# Patient Record
Sex: FEMALE | Race: WHITE | URBAN - METROPOLITAN AREA
[De-identification: names, ages, dates, MRNs, and addresses within clinical notes are randomized per-mention and may not be internally consistent; named-entity substitution may affect disease eponyms.]

---

## 2018-04-24 ENCOUNTER — TELEPHONE (OUTPATIENT)
Dept: OTHER | Facility: CLINIC | Age: 38
End: 2018-04-24

## 2018-04-24 NOTE — TELEPHONE ENCOUNTER
4/24/2018    Call Regarding Onboarding [employer] choices     Attempt 1    Message spoke with patient    Comments: Patient would like a callback at a later date      Outreach   Aure Hwang

## 2018-10-24 ENCOUNTER — RADIANT APPOINTMENT (OUTPATIENT)
Dept: GENERAL RADIOLOGY | Facility: CLINIC | Age: 38
End: 2018-10-24
Attending: FAMILY MEDICINE
Payer: COMMERCIAL

## 2018-10-24 ENCOUNTER — OFFICE VISIT (OUTPATIENT)
Dept: ORTHOPEDICS | Facility: CLINIC | Age: 38
End: 2018-10-24
Payer: COMMERCIAL

## 2018-10-24 VITALS
BODY MASS INDEX: 27.6 KG/M2 | HEIGHT: 62 IN | DIASTOLIC BLOOD PRESSURE: 81 MMHG | SYSTOLIC BLOOD PRESSURE: 119 MMHG | WEIGHT: 150 LBS

## 2018-10-24 DIAGNOSIS — S83.001A PATELLAR SUBLUXATION, RIGHT, INITIAL ENCOUNTER: Primary | ICD-10-CM

## 2018-10-24 DIAGNOSIS — M25.561 ACUTE PAIN OF RIGHT KNEE: ICD-10-CM

## 2018-10-24 PROCEDURE — 73562 X-RAY EXAM OF KNEE 3: CPT | Performed by: FAMILY MEDICINE

## 2018-10-24 PROCEDURE — 99203 OFFICE O/P NEW LOW 30 MIN: CPT | Performed by: FAMILY MEDICINE

## 2018-10-24 NOTE — PROGRESS NOTES
"[facility]   Clinic Visit s Oct 24, 2018    PCP: No Ref-Primary, Physician      Milla is a 37 year old female who is seen as self referral for   Chief Complaint   Patient presents with     Right Knee - Pain       Injury: {Precipitating Event:183709::\"Patient reports insidious onset without acute precipitating event.\"}     {HAND DOMINANCE:986997}    Location of Pain: {AJ side:593245} {AJ Location Joints:900149} {AJ location on limb:088481}, {AJ radiating pain:211334}   Duration of Pain: *** {DAY/WEEK/MONTH/YEAR:734979}  Rating of Pain at worst: {PAIN SCALE:788727}  Rating of Pain Currently: {PAIN SCALE:286955}  Pain is better with: {Pain (activities that relieve):642563}   Pain is worse with: {Worsenin}   Treatment so far consists of: {AJ treatment options:110528}  Associated symptoms: {AJassociatedSX:685545}  Recent imaging completed: {Imagin}.  Prior History of related problems: ***    Social History: {Work history:118595}     Past Medical History:   Diagnosis Date     Asthma        There are no active problems to display for this patient.      No family history on file.    Social History     Social History     Marital status: Single     Spouse name: N/A     Number of children: N/A     Years of education: N/A     Social History Main Topics     Smoking status: Current Every Day Smoker     Packs/day: 0.50     Smokeless tobacco: Never Used     Alcohol use 0.0 oz/week     1 - 3 Glasses of wine per week      Comment: daily     Drug use: No     Sexual activity: Yes     Partners: Female, Male     Birth control/ protection: Condom     Other Topics Concern     Not on file     Social History Narrative       Past Surgical History:   Procedure Laterality Date     EXTRACTION(S) DENTAL         ROS      Physical Exam    Ortho Exam  No results found for this or any previous visit (from the past 744 hour(s)).    "

## 2018-10-24 NOTE — LETTER
10/24/2018         RE: Milla Gottlieb  [address] [address]        Dear Colleague,    Thank you for referring your patient, Milla Gottlieb, to the  SPORTS MEDICINE. Please see a copy of my visit note below.    [facility]   Clinic Visit s Oct 24, 2018    PCP: No Ref-Primary, Physician      Milla is a 37 year old female who is seen as self referral for   Chief Complaint   Patient presents with     Right Knee - Pain       Injury: Patient describes injury as she pivoted and heard a pop in her right knee.     Right hand dominant    Location of Pain: right knee medial, nonradiating   Duration of Pain: 6 day(s)  Rating of Pain at worst: 8/10  Rating of Pain Currently: 2/10  Pain is better with: brace, activity avoidance, compression, rest and ibuprofen   Pain is worse with: mornings, pivoting, pushing, stair climbing and walking    Treatment so far consists of: activity avoidance, brace, compression, rest and ibuprofen  Associated symptoms: feeling of instability, locking or catching and swelling Mild  Recent imaging completed: No recent imaging completed.  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     Past Medical History:   Diagnosis Date     Asthma        FMHX denies sig illnesses.      Social History     Social History     Marital status: Single     Spouse name: N/A     Number of children: N/A     Years of education: N/A     Social History Main Topics     Smoking status: Current Every Day Smoker     Packs/day: 0.50     Smokeless tobacco: Never Used     Alcohol use 0.0 oz/week     1 - 3 Glasses of wine per week      Comment: daily       Past Surgical History:   Procedure Laterality Date     EXTRACTION(S) DENTAL               Review of Systems   Musculoskeletal: Positive for joint pain.   All other systems reviewed and are negative.        Physical  "Exam  /81  Ht 5' 2\" (1.575 m)  Wt 150 lb (68 kg)  BMI 27.44 kg/m2  Constitutional:well-developed, well-nourished, and in no distress.   Cardiovascular: Intact distal pulses.    Neurological: alert. Gait Normal:   Gait, station, stance, and balance appear normal for age  Skin: Skin is warm and dry.   Psychiatric: Mood and affect normal.   Respiratory: unlabored, speaks in full sentences  Lymph: no LAD, no lymphangitis          Right Knee Exam   Swelling: None  Effusion: No    Tenderness   The patient is experiencing tenderness in the medial retinaculum.    Range of Motion   Extension: Normal  Flexion:     Normal    Tests   McMurrays:  Medial - Negative      Lateral - Negative  Lachman:  Anterior - Negative    Posterior - Negative  Drawer:       Anterior - Negative    Posterior - Negative  Varus:  Negative  Valgus: Negative  Pivot Shift: Negative  Patellar Apprehension: Yes          X-ray images Ordered and independently reviewed by me in the office today with the patient. X-ray shows:   Recent Results (from the past 48 hour(s))   XR Knee Standing AP Bilat [place] Bilat Lat Right    Narrative    10/24/2018    No fracture, dislocation and or lesion. Normal alignment.  Joint space   maintained no significant arthritis. No appreciable soft tissue   abnormality       ASSESSMENT/PLAN    ICD-10-CM    1. Patellar subluxation, right, initial encounter S83.001A order for DME   2. Acute pain of right knee M25.561 XR Knee Standing AP Bilat [place] Bilat Lat Right     order for DME     Patella subluxation, relatively mild, normal gait, no evidence of significant internal derangement, reassurance, given hinged knee brace with patellar buttress, urged to wear for at least 2 weeks and longer if needed, discussed physical therapy but will defer for now unless repeated episodes develop.    Again, thank you for allowing me to participate in the care of your patient.        Sincerely,        Brian Wilkins MD    "

## 2018-10-24 NOTE — MR AVS SNAPSHOT
"              After Visit Summary   10/24/2018    Milla Gottlieb    MRN: [id number]           Patient Information     Date Of Birth          1980        Visit Information        Provider Department      10/24/2018 9:40 AM Brian Wilkins MD  Sports Medicine        Today's Diagnoses     Patellar subluxation, right, initial encounter    -  1    Acute pain of right knee           Follow-ups after your visit        Who to contact     If you have questions or need follow up information about today's clinic visit or your schedule please contact  SPORTS MEDICINE directly at [phone].  Normal or non-critical lab and imaging results will be communicated to you by Waspithart, letter or phone within 4 business days after the clinic has received the results. If you do not hear from us within 7 days, please contact the clinic through SimpleRegistryt or phone. If you have a critical or abnormal lab result, we will notify you by phone as soon as possible.  Submit refill requests through [employer] or call your pharmacy and they will forward the refill request to us. Please allow 3 business days for your refill to be completed.          Additional Information About Your Visit        MyChart Information     [employer] lets you send messages to your doctor, view your test results, renew your prescriptions, schedule appointments and more. To sign up, go to [email or web address] . Click on \"Log in\" on the left side of the screen, which will take you to the Welcome page. Then click on \"Sign up Now\" on the right side of the page.     You will be asked to enter the access code listed below, as well as some personal information. Please follow the directions to create your username and password.     Your access code is: [email or web address]  Expires: 2019  8:29 PM     Your access code will  in 90 days. If you need help or a new code, please call your [facility] clinic or [phone].        Care EveryWhere ID     This is your Care " "EveryWhere ID. This could be used by other organizations to access your [place] medical records  [id number]        Your Vitals Were     Height BMI (Body Mass Index)                5' 2\" (1.575 m) 27.44 kg/m2           Blood Pressure from Last 3 Encounters:   10/24/18 119/81   07/19/11 124/77    Weight from Last 3 Encounters:   10/24/18 150 lb (68 kg)   07/19/11 130 lb (59 kg)                 Today's Medication Changes          These changes are accurate as of 10/24/18  8:29 PM.  If you have any questions, ask your nurse or doctor.               Start taking these medicines.        Dose/Directions    order for DME   Used for:  Patellar subluxation, right, initial encounter, Acute pain of right knee   Started by:  Brian Wilkins MD        Equipment being ordered: hinged knee brace, LG   Quantity:  1 Device   Refills:  0            Where to get your medicines      Some of these will need a paper prescription and others can be bought over the counter.  Ask your nurse if you have questions.     Bring a paper prescription for each of these medications     order for DME                Primary Care Provider Fax #    Physician No Ref-Primary [phone]       No address on file        Equal Access to Services     [facility] : Hadzoe Alfred, wahomero costello, qaal gutierrez, lupillo calix . So [facility] [phone].    ATENCIÓN: Si habla español, tiene a hawkins disposición servicios gratuitos de asistencia lingüística. Llame al [phone].    We comply with applicable federal civil rights laws and [place] laws. We do not discriminate on the basis of race, color, national origin, age, disability, sex, sexual orientation, or gender identity.            Thank you!     Thank you for choosing  SPORTS MEDICINE  for your care. Our goal is always to provide you with excellent care. Hearing back from our patients is one way we can continue to improve our services. " Please take a few minutes to complete the written survey that you may receive in the mail after your visit with us. Thank you!             Your Updated Medication List - Protect others around you: Learn how to safely use, store and throw away your medicines at www.disposemymeds.org.          This list is accurate as of 10/24/18  8:29 PM.  Always use your most recent med list.                   Brand Name Dispense Instructions for use Diagnosis    ADVIL 200 MG capsule   Generic drug:  ibuprofen      Take 400 mg by mouth every 4 hours as needed.        albuterol 90 MCG/ACT inhaler      Inhale 2 puffs into the lungs every 6 hours as needed.        order for DME     1 Device    Equipment being ordered: hinged knee brace, LG    Patellar subluxation, right, initial encounter, Acute pain of right knee       SINGULAIR PO

## 2018-10-24 NOTE — PROGRESS NOTES
"[facility]   Clinic Visit s Oct 24, 2018    PCP: No Ref-Primary, Physician      Milla is a 37 year old female who is seen as self referral for   Chief Complaint   Patient presents with     Right Knee - Pain       Injury: Patient describes injury as she pivoted and heard a pop in her right knee.     Right hand dominant    Location of Pain: right knee medial, nonradiating   Duration of Pain: 6 day(s)  Rating of Pain at worst: 8/10  Rating of Pain Currently: 2/10  Pain is better with: brace, activity avoidance, compression, rest and ibuprofen   Pain is worse with: mornings, pivoting, pushing, stair climbing and walking    Treatment so far consists of: activity avoidance, brace, compression, rest and ibuprofen  Associated symptoms: feeling of instability, locking or catching and swelling Mild  Recent imaging completed: No recent imaging completed.  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     Past Medical History:   Diagnosis Date     Asthma        FMHX denies sig illnesses.      Social History     Social History     Marital status: Single     Spouse name: N/A     Number of children: N/A     Years of education: N/A     Social History Main Topics     Smoking status: Current Every Day Smoker     Packs/day: 0.50     Smokeless tobacco: Never Used     Alcohol use 0.0 oz/week     1 - 3 Glasses of wine per week      Comment: daily       Past Surgical History:   Procedure Laterality Date     EXTRACTION(S) DENTAL               Review of Systems   Musculoskeletal: Positive for joint pain.   All other systems reviewed and are negative.        Physical Exam  /81  Ht 5' 2\" (1.575 m)  Wt 150 lb (68 kg)  BMI 27.44 kg/m2  Constitutional:well-developed, well-nourished, and in no distress.   Cardiovascular: Intact distal pulses.    Neurological: alert. Gait Normal:   Gait, station, " stance, and balance appear normal for age  Skin: Skin is warm and dry.   Psychiatric: Mood and affect normal.   Respiratory: unlabored, speaks in full sentences  Lymph: no LAD, no lymphangitis          Right Knee Exam   Swelling: None  Effusion: No    Tenderness   The patient is experiencing tenderness in the medial retinaculum.    Range of Motion   Extension: Normal  Flexion:     Normal    Tests   McMurrays:  Medial - Negative      Lateral - Negative  Lachman:  Anterior - Negative    Posterior - Negative  Drawer:       Anterior - Negative    Posterior - Negative  Varus:  Negative  Valgus: Negative  Pivot Shift: Negative  Patellar Apprehension: Yes          X-ray images Ordered and independently reviewed by me in the office today with the patient. X-ray shows:   Recent Results (from the past 48 hour(s))   XR Knee Standing AP Bilat [place] Bilat Lat Right    Narrative    10/24/2018    No fracture, dislocation and or lesion. Normal alignment.  Joint space   maintained no significant arthritis. No appreciable soft tissue   abnormality       ASSESSMENT/PLAN    ICD-10-CM    1. Patellar subluxation, right, initial encounter S83.001A order for DME   2. Acute pain of right knee M25.561 XR Knee Standing AP Bilat [place] Bilat Lat Right     order for DME     Patella subluxation, relatively mild, normal gait, no evidence of significant internal derangement, reassurance, given hinged knee brace with patellar buttress, urged to wear for at least 2 weeks and longer if needed, discussed physical therapy but will defer for now unless repeated episodes develop.

## 2018-11-30 ENCOUNTER — OFFICE VISIT (OUTPATIENT)
Dept: ORTHOPEDICS | Facility: CLINIC | Age: 38
End: 2018-11-30
Payer: COMMERCIAL

## 2018-11-30 VITALS
WEIGHT: 150 LBS | HEIGHT: 62 IN | BODY MASS INDEX: 27.6 KG/M2 | DIASTOLIC BLOOD PRESSURE: 62 MMHG | SYSTOLIC BLOOD PRESSURE: 112 MMHG

## 2018-11-30 DIAGNOSIS — M25.561 ACUTE PAIN OF RIGHT KNEE: Primary | ICD-10-CM

## 2018-11-30 PROCEDURE — 99214 OFFICE O/P EST MOD 30 MIN: CPT | Performed by: FAMILY MEDICINE

## 2018-11-30 RX ORDER — HYDROCODONE BITARTRATE AND ACETAMINOPHEN 5; 325 MG/1; MG/1
1 TABLET ORAL EVERY 6 HOURS PRN
Qty: 10 TABLET | Refills: 0 | Status: SHIPPED | OUTPATIENT
Start: 2018-11-30 | End: 2018-12-20

## 2018-11-30 NOTE — MR AVS SNAPSHOT
"              After Visit Summary   11/30/2018    Milla Gottlieb    MRN: [id number]           Patient Information     Date Of Birth          1980        Visit Information        Provider Department      11/30/2018 1:20 PM Brian Wilkins MD [facility] [place]        Today's Diagnoses     Acute pain of right knee    -  1       Follow-ups after your visit        Future tests that were ordered for you today     Open Future Orders        Priority Expected Expires Ordered    MR Knee Right w/o Contrast STAT  11/30/2019 11/30/2018            Who to contact     If you have questions or need follow up information about today's clinic visit or your schedule please contact [facility] [place] directly at [phone].  Normal or non-critical lab and imaging results will be communicated to you by Gimmiehart, letter or phone within 4 business days after the clinic has received the results. If you do not hear from us within 7 days, please contact the clinic through Gimmiehart or phone. If you have a critical or abnormal lab result, we will notify you by phone as soon as possible.  Submit refill requests through [facility] or call your pharmacy and they will forward the refill request to us. Please allow 3 business days for your refill to be completed.          Additional Information About Your Visit        MyCharEdeniQ Information     [facility] lets you send messages to your doctor, view your test results, renew your prescriptions, schedule appointments and more. To sign up, go to [email or web address] . Click on \"Log in\" on the left side of the screen, which will take you to the Welcome page. Then click on \"Sign up Now\" on the right side of the page.     You will be asked to enter the access code listed below, as well as some personal information. Please follow the directions to create your username and password.     Your access code is: [email or web address]  Expires: 1/22/2019 " " 7:29 PM     Your access code will  in 90 days. If you need help or a new code, please call your [facility] or [phone].        Care EveryWhere ID     This is your Care EveryWhere ID. This could be used by other organizations to access your [place] medical records  [id number]        Your Vitals Were     Height BMI (Body Mass Index)                5' 2\" (1.575 m) 27.44 kg/m2           Blood Pressure from Last 3 Encounters:   18 112/62   10/24/18 119/81   11 124/77    Weight from Last 3 Encounters:   18 150 lb (68 kg)   10/24/18 150 lb (68 kg)   11 130 lb (59 kg)                 Today's Medication Changes          These changes are accurate as of 18  1:48 PM.  If you have any questions, ask your nurse or doctor.               Start taking these medicines.        Dose/Directions    HYDROcodone-acetaminophen 5-325 MG tablet   Commonly known as:  NORCO   Used for:  Acute pain of right knee   Started by:  Brian Wilkins MD        Dose:  1 tablet   Take 1 tablet by mouth every 6 hours as needed for severe pain   Quantity:  10 tablet   Refills:  0            Where to get your medicines      Some of these will need a paper prescription and others can be bought over the counter.  Ask your nurse if you have questions.     Bring a paper prescription for each of these medications     HYDROcodone-acetaminophen 5-325 MG tablet               Information about OPIOIDS     PRESCRIPTION OPIOIDS: WHAT YOU NEED TO KNOW   We gave you an opioid (narcotic) pain medicine. It is important to manage your pain, but opioids are not always the best choice. You should first try all the other options your care team gave you. Take this medicine for as short a time (and as few doses) as possible.    Some activities can increase your pain, such as bandage changes or therapy sessions. It may help to take your pain medicine 30 to 60 minutes before these activities. Reduce your stress by getting " enough sleep, working on hobbies you enjoy and practicing relaxation or meditation. Talk to your care team about ways to manage your pain beyond prescription opioids.    These medicines have risks:    DO NOT drive when on new or higher doses of pain medicine. These medicines can affect your alertness and reaction times, and you could be arrested for driving under the influence (DUI). If you need to use opioids long-term, talk to your care team about driving.    DO NOT operate heavy machinery    DO NOT do any other dangerous activities while taking these medicines.    DO NOT drink any alcohol while taking these medicines.     If the opioid prescribed includes acetaminophen, DO NOT take with any other medicines that contain acetaminophen. Read all labels carefully. Look for the word  acetaminophen  or  Tylenol.  Ask your pharmacist if you have questions or are unsure.    You can get addicted to pain medicines, especially if you have a history of addiction (chemical, alcohol or substance dependence). Talk to your care team about ways to reduce this risk.    All opioids tend to cause constipation. Drink plenty of water and eat foods that have a lot of fiber, such as fruits, vegetables, prune juice, apple juice and high-fiber cereal. Take a laxative (Miralax, milk of magnesia, Colace, Senna) if you don t move your bowels at least every other day. Other side effects include upset stomach, sleepiness, dizziness, throwing up, tolerance (needing more of the medicine to have the same effect), physical dependence and slowed breathing.    Store your pills in a secure place, locked if possible. We will not replace any lost or stolen medicine. If you don t finish your medicine, please throw away (dispose) as directed by your pharmacist. The [place] Pollution Control Agency has more information about safe disposal: https://www.pca.state.[place]/living-green/managing-unwanted-medications         Primary Care Provider Fax #     Provider Not In System [phone]                Equal Access to Services     NAVEEN HARRIS : Hadii aad ku hadbiarolo Alfred, yo costello, dimitri langmajoseph gutierrez, lupillo webb. So [facility] [phone].    ATENCIÓN: Si habla español, tiene a hawkins disposición servicios gratuitos de asistencia lingüística. Llame al [phone].    We comply with applicable federal civil rights laws and [place] laws. We do not discriminate on the basis of race, color, national origin, age, disability, sex, sexual orientation, or gender identity.            Thank you!     Thank you for choosing [place] [facility] [place]  for your care. Our goal is always to provide you with excellent care. Hearing back from our patients is one way we can continue to improve our services. Please take a few minutes to complete the written survey that you may receive in the mail after your visit with us. Thank you!             Your Updated Medication List - Protect others around you: Learn how to safely use, store and throw away your medicines at www.disposemymeds.org.          This list is accurate as of 11/30/18  1:48 PM.  Always use your most recent med list.                   Brand Name Dispense Instructions for use Diagnosis    ADVIL 200 MG capsule   Generic drug:  ibuprofen      Take 400 mg by mouth every 4 hours as needed.        albuterol 90 MCG/ACT inhaler      Inhale 2 puffs into the lungs every 6 hours as needed.        HYDROcodone-acetaminophen 5-325 MG tablet    NORCO    10 tablet    Take 1 tablet by mouth every 6 hours as needed for severe pain    Acute pain of right knee       order for DME     1 Device    Equipment being ordered: hinged knee brace, LG    Patellar subluxation, right, initial encounter, Acute pain of right knee       SINGULAIR PO

## 2018-11-30 NOTE — PROGRESS NOTES
"Musculoskeletal Problem          [facility]   Follow-up Visit s Nov 30, 2018    PCP: System, Provider Not In      Subjective:  Milla is a 37 year old female who is seen in follow up for evaluation of   Chief Complaint   Patient presents with     Right Knee - Pain     Her last visit was on 10/24/18.  Since that time, symptoms have been worse than before. Milla Gottlieb is accompanied today by self.     Patient has noticed improved symptoms with after initial injury but then yesterday she felt her knee pop and increased pain.  Patient has slight swelling  Pain is located right knee anterior and medial, persistent.  Patient is using crutches and knee immobilizer.   Patient denies any new injuries.    Patient's past medical, surgical, social and family histories are reviewed today.    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     Past Medical History:   Diagnosis Date     Asthma        FMHX denies sig illnesses.      Social History     Social History     Marital status: Single     Spouse name: N/A     Number of children: N/A     Years of education: N/A     Social History Main Topics     Smoking status: Current Every Day Smoker     Packs/day: 0.50     Smokeless tobacco: Never Used     Alcohol use 0.0 oz/week     1 - 3 Glasses of wine per week      Comment: daily       Past Surgical History:   Procedure Laterality Date     EXTRACTION(S) DENTAL               Review of Systems   Musculoskeletal: Positive for joint pain.   All other systems reviewed and are negative.        Physical Exam   Musculoskeletal:        Right knee: Medial joint line tenderness noted.     /62  Ht 5' 2\" (1.575 m)  Wt 150 lb (68 kg)  BMI 27.44 kg/m2  Constitutional:well-developed, well-nourished, and in no distress.   Cardiovascular: Intact distal pulses.    Neurological: alert. Gait Abnormal:   Antalgic gait  Skin: Skin is warm and dry. "   Psychiatric: Mood and affect normal.   Respiratory: unlabored, speaks in full sentences  Lymph: no LAD, no lymphangitis          Right Knee Exam   Swelling: Moderate  Effusion: Yes    Tenderness   The patient is experiencing tenderness in the medial joint line.    Range of Motion   Extension: 10  Flexion:     90    Tests   McMurrays:  Medial - Positive      Lateral - Negative  Lachman:  Anterior - Negative    Posterior - Negative  Drawer:       Anterior - Negative    Posterior - Negative  Varus:  Negative  Valgus: Negative  Pivot Shift: Negative  Patellar Apprehension: No      XR KNEE 3 VIEWS AP LAT SUNRISE RIGHT11/29/2018  [facility]  Result Narrative   INDICATION:  Knee injury.    TECHNIQUE:  Three views right knee.    FINDINGS:  No acute fracture or dislocation in right knee. Apparent tiny right knee effusion. Mild sclerosis along the tibial spines is very nonspecific. Right knee otherwise negative.      Dictated by Osman Molina MD @ Nov 29 2018  2:59PM    (Electronically Signed)   Other Result Information   Interface, [employer] - 11/29/2018  2:59 PM CST  INDICATION:  Knee injury.    TECHNIQUE:  Three views right knee.    FINDINGS:  No acute fracture or dislocation in right knee. Apparent tiny right knee effusion. Mild sclerosis along the tibial spines is very nonspecific. Right knee otherwise negative.      Dictated by Osman Molina MD @ Nov 29 2018  2:59PM    (Electronically Signed)     ASSESSMENT/PLAN    ICD-10-CM    1. Acute pain of right knee M25.561 MR Knee Right w/o Contrast     HYDROcodone-acetaminophen (NORCO) 5-325 MG tablet     Acute injury with new symptoms, now consistent with mensicus injury, possible bucket handle type given loss of ROM. MRI ordered, will call or MyChart with results,

## 2018-11-30 NOTE — LETTER
11/30/2018         RE: Milla Gottlieb  [address] [address]        Dear Colleague,    Thank you for referring your patient, Milla Gottlieb, to the [facility]. Please see a copy of my visit note below.    Musculoskeletal Problem          [facility]   Follow-up Visit s Nov 30, 2018    PCP: System, Provider Not In      Subjective:  Milla is a 37 year old female who is seen in follow up for evaluation of   Chief Complaint   Patient presents with     Right Knee - Pain     Her last visit was on 10/24/18.  Since that time, symptoms have been worse than before. Milla Gottlieb is accompanied today by self.     Patient has noticed improved symptoms with after initial injury but then yesterday she felt her knee pop and increased pain.  Patient has slight swelling  Pain is located right knee anterior and medial, persistent.  Patient is using crutches and knee immobilizer.   Patient denies any new injuries.    Patient's past medical, surgical, social and family histories are reviewed today.    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     Past Medical History:   Diagnosis Date     Asthma        FMHX denies sig illnesses.      Social History     Social History     Marital status: Single     Spouse name: N/A     Number of children: N/A     Years of education: N/A     Social History Main Topics     Smoking status: Current Every Day Smoker     Packs/day: 0.50     Smokeless tobacco: Never Used     Alcohol use 0.0 oz/week     1 - 3 Glasses of wine per week      Comment: daily       Past Surgical History:   Procedure Laterality Date     EXTRACTION(S) DENTAL               Review of Systems   Musculoskeletal: Positive for joint pain.   All other systems reviewed and are negative.        Physical Exam   Musculoskeletal:        Right knee: Medial joint line tenderness noted.     BP  "112/62  Ht 5' 2\" (1.575 m)  Wt 150 lb (68 kg)  BMI 27.44 kg/m2  Constitutional:well-developed, well-nourished, and in no distress.   Cardiovascular: Intact distal pulses.    Neurological: alert. Gait Abnormal:   Antalgic gait  Skin: Skin is warm and dry.   Psychiatric: Mood and affect normal.   Respiratory: unlabored, speaks in full sentences  Lymph: no LAD, no lymphangitis          Right Knee Exam   Swelling: Moderate  Effusion: Yes    Tenderness   The patient is experiencing tenderness in the medial joint line.    Range of Motion   Extension: 10  Flexion:     90    Tests   McMurrays:  Medial - Positive      Lateral - Negative  Lachman:  Anterior - Negative    Posterior - Negative  Drawer:       Anterior - Negative    Posterior - Negative  Varus:  Negative  Valgus: Negative  Pivot Shift: Negative  Patellar Apprehension: No      XR KNEE 3 VIEWS AP LAT SUNRISE RIGHT11/29/2018  [facility]  Result Narrative   INDICATION:  Knee injury.    TECHNIQUE:  Three views right knee.    FINDINGS:  No acute fracture or dislocation in right knee. Apparent tiny right knee effusion. Mild sclerosis along the tibial spines is very nonspecific. Right knee otherwise negative.      Dictated by Osman Molina MD @ Nov 29 2018  2:59PM    (Electronically Signed)   Other Result Information   Interface, [employer] - 11/29/2018  2:59 PM CST  INDICATION:  Knee injury.    TECHNIQUE:  Three views right knee.    FINDINGS:  No acute fracture or dislocation in right knee. Apparent tiny right knee effusion. Mild sclerosis along the tibial spines is very nonspecific. Right knee otherwise negative.      Dictated by Osman Molina MD @ Nov 29 2018  2:59PM    (Electronically Signed)     ASSESSMENT/PLAN    ICD-10-CM    1. Acute pain of right knee M25.561 MR Knee Right w/o Contrast     HYDROcodone-acetaminophen (NORCO) 5-325 MG tablet     Acute injury with new symptoms, now consistent with mensicus injury, possible bucket " handle type given loss of ROM. MRI ordered, will call or MyChart with results,           Again, thank you for allowing me to participate in the care of your patient.        Sincerely,        Brian Wilkins MD

## 2018-12-02 ENCOUNTER — HEALTH MAINTENANCE LETTER (OUTPATIENT)
Age: 38
End: 2018-12-02

## 2018-12-03 ENCOUNTER — HOSPITAL ENCOUNTER (OUTPATIENT)
Dept: MRI IMAGING | Facility: CLINIC | Age: 38
Discharge: HOME | End: 2018-12-03
Attending: FAMILY MEDICINE | Admitting: FAMILY MEDICINE
Payer: COMMERCIAL

## 2018-12-03 DIAGNOSIS — M25.561 ACUTE PAIN OF RIGHT KNEE: ICD-10-CM

## 2018-12-03 PROCEDURE — 73721 MRI JNT OF LWR EXTRE W/O DYE: CPT | Mod: RT

## 2018-12-04 ENCOUNTER — TELEPHONE (OUTPATIENT)
Dept: ORTHOPEDICS | Facility: CLINIC | Age: 38
End: 2018-12-04

## 2018-12-04 DIAGNOSIS — S83.219A: Primary | ICD-10-CM

## 2018-12-04 DIAGNOSIS — S83.511A RUPTURE OF ANTERIOR CRUCIATE LIGAMENT OF RIGHT KNEE, INITIAL ENCOUNTER: ICD-10-CM

## 2018-12-04 NOTE — TELEPHONE ENCOUNTER
Spoke with patient, gave her MRI results.  With a bucket-handle tear impeding knee flexion and also acute ACL tear, urgent ortho evaluation required.  Referral placed ASAP. Dr Monroy at  most convenient, [facility] as next choice if needed.

## 2018-12-20 ENCOUNTER — OFFICE VISIT (OUTPATIENT)
Dept: FAMILY MEDICINE | Facility: CLINIC | Age: 38
End: 2018-12-20
Payer: COMMERCIAL

## 2018-12-20 VITALS
WEIGHT: 151 LBS | HEIGHT: 62 IN | OXYGEN SATURATION: 95 % | DIASTOLIC BLOOD PRESSURE: 68 MMHG | HEART RATE: 70 BPM | BODY MASS INDEX: 27.79 KG/M2 | SYSTOLIC BLOOD PRESSURE: 119 MMHG | TEMPERATURE: 99.4 F

## 2018-12-20 DIAGNOSIS — Z23 NEED FOR PROPHYLACTIC VACCINATION AND INOCULATION AGAINST INFLUENZA: ICD-10-CM

## 2018-12-20 DIAGNOSIS — Z01.818 PREOP GENERAL PHYSICAL EXAM: Primary | ICD-10-CM

## 2018-12-20 DIAGNOSIS — S89.91XD INJURY OF RIGHT KNEE, SUBSEQUENT ENCOUNTER: ICD-10-CM

## 2018-12-20 PROCEDURE — 90686 IIV4 VACC NO PRSV 0.5 ML IM: CPT | Performed by: NURSE PRACTITIONER

## 2018-12-20 PROCEDURE — 90471 IMMUNIZATION ADMIN: CPT | Performed by: NURSE PRACTITIONER

## 2018-12-20 PROCEDURE — 99204 OFFICE O/P NEW MOD 45 MIN: CPT | Mod: 25 | Performed by: NURSE PRACTITIONER

## 2018-12-20 ASSESSMENT — MIFFLIN-ST. JEOR: SCORE: 1333.18

## 2018-12-20 NOTE — PROGRESS NOTES
[facility]  [address] [facility] [address]  [phone]  Dept: [phone]    PRE-OP EVALUATION:  Today's date: 2018    Milla Gottlieb (: 1980) presents for pre-operative evaluation assessment as requested by Dr. East.  She requires evaluation and anesthesia risk assessment prior to undergoing surgery/procedure for treatment of Right Knee pain .    Proposed Surgery/ Procedure: ACL Repair and Menicus  Date of Surgery/ Procedure: 1/3/19  Time of Surgery/ Procedure: 12 pm  Hospital/Surgical Facility: [facility]  Fax number for surgical facility: [phone]  Primary Physician: No Ref-Primary, Physician  Type of Anesthesia Anticipated: General    Patient has a Health Care Directive or Living Will:  NO    1. NO - Do you have a history of heart attack, stroke, stent, bypass or surgery on an artery in the head, neck, heart or legs?  2.YES - Do you ever have any pain or discomfort in your chest? R/T asthma. Uses inhaler daily  3. NO - Do you have a history of  Heart Failure?  4. YES - Are you troubled by shortness of breath when: walking on the level, up a slight hill or at night?  R/T asthma. Uses inhaler daily  5. NO - Do you currently have a cold, bronchitis or other respiratory infection?  6. YES - Do you have a cough, shortness of breath or wheezing? R/t asthma. Uses inhaler daily  7. NO - Do you sometimes get pains in the calves of your legs when you walk?  8. NO - Do you or anyone in your family have previous history of blood clots?  9. NO - Do you or does anyone in your family have a serious bleeding problem such as prolonged bleeding following surgeries or cuts?  10. NO - Have you ever had problems with anemia or been told to take iron pills?  11. NO - Have you had any abnormal blood loss such as black, tarry or bloody stools, or abnormal vaginal bleeding?  12. NO - Have you ever had a blood transfusion?  13. NO - Have you or any of your relatives ever had problems with  anesthesia?  14. NO - Do you have sleep apnea, excessive snoring or daytime drowsiness?  15. NO - Do you have any prosthetic heart valves?  16. NO - Do you have prosthetic joints?  17. NO - Is there any chance that you may be pregnant?      HPI:     HPI related to upcoming procedure:  recent meniscus and acl injury. Going for repair  Has a lifelong history of asthma  No significant coughing, wheezing recently. No URI. Does feel just mildly tight. She will plan to use her steroid inhaler daily until surgery    She overall is feeling well       See problem list for active medical problems.  Problems all longstanding and stable, except as noted/documented.  See ROS for pertinent symptoms related to these conditions.                                                                                                                                                          .    MEDICAL HISTORY:     Patient Active Problem List    Diagnosis Date Noted     Acute pain of right knee 11/30/2018     Priority: Medium      Past Medical History:   Diagnosis Date     Asthma      Past Surgical History:   Procedure Laterality Date     EXTRACTION(S) DENTAL       Current Outpatient Medications   Medication Sig Dispense Refill     albuterol 90 MCG/ACT inhaler Inhale 2 puffs into the lungs every 6 hours as needed.       HYDROcodone-acetaminophen (NORCO) 5-325 MG tablet Take 1 tablet by mouth every 6 hours as needed for severe pain 10 tablet 0     Ibuprofen (ADVIL) 200 MG capsule Take 400 mg by mouth every 4 hours as needed.       Montelukast Sodium (SINGULAIR PO)        order for DME Equipment being ordered: hinged knee brace, LG (Patient not taking: Reported on 11/30/2018) 1 Device 0     OTC products: steroid inhaler     Allergies   Allergen Reactions     Dairy [Milk Products] GI Disturbance     Gluten GI Disturbance      Latex Allergy: NO    Social History     Tobacco Use     Smoking status: Current Every Day Smoker     Packs/day: 0.50      "Smokeless tobacco: Never Used   Substance Use Topics     Alcohol use: Yes     Alcohol/week: 0.0 oz     Types: 1 - 3 Glasses of wine per week     Comment: daily     History   Drug Use No       REVIEW OF SYSTEMS:   Constitutional, neuro, ENT, endocrine, pulmonary, cardiac, gastrointestinal, genitourinary, musculoskeletal, integument and psychiatric systems are negative, except as otherwise noted.    EXAM:   /68 (BP Location: Right arm, Patient Position: Chair, Cuff Size: Adult Regular)   Pulse 70   Temp 99.4  F (37.4  C) (Tympanic)   Ht 1.575 m (5' 2\")   Wt 68.5 kg (151 lb)   SpO2 95%   BMI 27.62 kg/m      GENERAL APPEARANCE: healthy, alert and no distress     EYES: EOMI, PERRL     HENT: mouth without ulcers or lesions     NECK: no adenopathy, no asymmetry, masses, or scars and thyroid normal to palpation     RESP: lungs clear to auscultation - no rales, rhonchi or wheezes     CV: regular rates and rhythm, normal S1 S2, no S3 or S4 and no murmur, click or rub     MS: extremities normal- no gross deformities noted, no evidence of inflammation in joints, FROM in all extremities.     SKIN: no suspicious lesions or rashes     NEURO: Normal strength and tone, sensory exam grossly normal, mentation intact and speech normal     PSYCH: mentation appears normal. and affect normal/bright     LYMPHATICS: No cervical adenopathy    DIAGNOSTICS:   No labs or EKG required for low risk surgery (cataract, skin procedure, breast biopsy, etc)    Recent Labs   Lab Test 07/19/11  1535      POTASSIUM 3.8   CR 0.60        IMPRESSION:   Reason for surgery/procedure: right knee acl and meniscus   Diagnosis/reason for consult: medical preop    The proposed surgical procedure is considered LOW risk.    REVISED CARDIAC RISK INDEX  The patient has the following serious cardiovascular risks for perioperative complications such as (MI, PE, VFib and 3  AV Block):  No serious cardiac risks  INTERPRETATION: 0 risks: Class I (very low " risk - 0.4% complication rate)    The patient has the following additional risks for perioperative complications:  No identified additional risks      ICD-10-CM    1. Preop general physical exam Z01.818    2. Injury of right knee, subsequent encounter S89.91XD        RECOMMENDATIONS:         --Patient is to take all scheduled medications on the day of surgery EXCEPT for modifications listed below.    APPROVAL GIVEN to proceed with proposed procedure, without further diagnostic evaluation       Signed Electronically by: GLENDA Gutierrez CNP    Copy of this evaluation report is provided to requesting physician.    [facility] Preop Guidelines    Revised Cardiac Risk Index

## 2018-12-20 NOTE — PROGRESS NOTES

## 2019-11-06 ENCOUNTER — HEALTH MAINTENANCE LETTER (OUTPATIENT)
Age: 39
End: 2019-11-06

## 2020-08-30 ENCOUNTER — AMBULATORY - HEALTHEAST (OUTPATIENT)
Dept: FAMILY MEDICINE | Facility: CLINIC | Age: 40
End: 2020-08-30

## 2020-08-30 ENCOUNTER — VIRTUAL VISIT (OUTPATIENT)
Dept: FAMILY MEDICINE | Facility: OTHER | Age: 40
End: 2020-08-30

## 2020-08-30 DIAGNOSIS — Z20.822 SUSPECTED COVID-19 VIRUS INFECTION: ICD-10-CM

## 2020-08-31 NOTE — PROGRESS NOTES
"Date: 2020 09:29:37  Clinician: Arturo Mendez  Clinician NPI: [id number]  Patient: Milla Gottlieb  Patient : 1980  Patient Address: [address] [place] [address]  Patient Phone: [phone]  Visit Protocol: URI  Patient Summary:  Milla is a 39 year old ( : 1980 ) female who initiated a Visit for COVID-19 (Coronavirus) evaluation and screening. When asked the question \"Please sign me up to receive news, health information and promotions. \", Milla responded \"No\".    Milla states her symptoms started 1-2 days ago.   Her symptoms consist of malaise, wheezing, diarrhea, nasal congestion, rhinitis, myalgia, and anosmia. She is experiencing difficulty breathing due to nasal congestion but she is not short of breath.   Symptom details     Nasal secretions: The color of her mucus is white and clear.    Wheezing: Milla has been diagnosed with asthma. Additional wheezing details as reported by the patient (free text): I have had asthma my whole life and am having mild asthma symptoms        Milla denies having ear pain, headache, chills, fever, sore throat, teeth pain, ageusia, cough, vomiting, nausea, and facial pain or pressure. She also denies having recent facial or sinus surgery in the past 60 days and taking antibiotic medication in the past month.   Precipitating events  She has not recently been exposed to someone with influenza. Milla has been in close contact with the following high risk individuals: people with asthma, heart disease or diabetes, immunocompromised people, children under the age of 5, and adults 65 or older.   Pertinent COVID-19 (Coronavirus) information  In the past 14 days, Milla has not worked in a congregate living setting.   She does not work or volunteer as healthcare worker or a  and does not work or volunteer in a healthcare facility.   Milla also has not lived in a congregate living setting in the past 14 days. She does not live with a " healthcare worker.   Milla has not had a close contact with a laboratory-confirmed COVID-19 patient within 14 days of symptom onset.   Since December 2019, Milla and has had upper respiratory infection (URI) or influenza-like illness. Has not been diagnosed with lab-confirmed COVID-19 test      Date(s) of previous URI or influenza-like illness (free-text): January     Symptoms Milla experienced during previous URI or influenza-like illness as reported by the patient (free-text): Cough, runny nose, body aches, sinus pressure        Pertinent medical history  Milla typically gets a yeast infection when she takes antibiotics. She has used fluconazole (Diflucan) to treat previous yeast infections. 1 dose of fluconazole (Diflucan) has typically been sufficient for symptoms to resolve in the past.   Milla does not need a return to work/school note.   Weight: 150 lbs   Milla smokes or uses smokeless tobacco.   She denies pregnancy and denies breastfeeding. She has menstruated in the past month.   Weight: 150 lbs    MEDICATIONS: albuterol sulfate inhalation, ALLERGIES: Singulair, albuterol  Clinician Response:  Dear Milla,   Your symptoms show that you may have coronavirus (COVID-19). This illness can cause fever, cough and trouble breathing. Many people get a mild case and get better on their own. Some people can get very sick.  What should I do?  We would like to test you for this virus.   1. Please call [phone] to schedule your visit. Explain that you were referred by [facility] to have a COVID-19 test. Be ready to share your [facility] visit ID number.  The following will serve as your written order for this COVID Test, ordered by me, for the indication of suspected COVID [Z20.828]: The test will be ordered in [employer], our electronic health record, after you are scheduled. It will show as ordered and authorized by Momo Webb MD.  Order: COVID-19 (Coronavirus) PCR for SYMPTOMATIC testing from [facility].      2. When it's time  "for your COVID test:  Stay at least 6 feet away from others. (If someone will drive you to your test, stay in the backseat, as far away from the  as you can.)   Cover your mouth and nose with a mask, tissue or washcloth.  Go straight to the testing site. Don't make any stops on the way there or back.      3.Starting now: Stay home and away from others (self-isolate) until:   You've had no fever---and no medicine that reduces fever---for one full day (24 hours). And...   Your other symptoms have gotten better. For example, your cough or breathing has improved. And...   At least 10 days have passed since your symptoms started.       During this time, don't leave the house except for testing or medical care.   Stay in your own room, even for meals. Use your own bathroom if you can.   Stay away from others in your home. No hugging, kissing or shaking hands. No visitors.  Don't go to work, school or anywhere else.    Clean \"high touch\" surfaces often (doorknobs, counters, handles, etc.). Use a household cleaning spray or wipes. You'll find a full list of  on the EPA website: www.epa.gov/pesticide-registration/list-n-disinfectants-use-against-sars-cov-2.   Cover your mouth and nose with a mask, tissue or washcloth to avoid spreading germs.  Wash your hands and face often. Use soap and water.  Caregivers in these groups are at risk for severe illness due to COVID-19:  o People 65 years and older  o People who live in a nursing home or long-term care facility  o People with chronic disease (lung, heart, cancer, diabetes, kidney, liver, immunologic)  o People who have a weakened immune system, including those who:   Are in cancer treatment  Take medicine that weakens the immune system, such as corticosteroids  Had a bone marrow or organ transplant  Have an immune deficiency  Have poorly controlled HIV or AIDS  Are obese (body mass index of 40 or higher)  Smoke regularly   o Caregivers should wear gloves while " washing dishes, handling laundry and cleaning bedrooms and bathrooms.  o Use caution when washing and drying laundry: Don't shake dirty laundry, and use the warmest water setting that you can.  o For more tips, go to www.cdc.gov/coronavirus/2019-ncov/downloads/10Things.pdf.    4.Sign up for [employer]. We know it's scary to hear that you might have COVID-19. We want to track your symptoms to make sure you're okay over the next 2 weeks. Please look for an email from [employer]---this is a free, online program that we'll use to keep in touch. To sign up, follow the link in the email. Learn more at [email or web address]  How can I take care of myself?   Get lots of rest. Drink extra fluids (unless a doctor has told you not to).   Take Tylenol (acetaminophen) for fever or pain. If you have liver or kidney problems, ask your family doctor if it's okay to take Tylenol.   Adults can take either:    650 mg (two 325 mg pills) every 4 to 6 hours, or...   1,000 mg (two 500 mg pills) every 8 hours as needed.    Note: Don't take more than 3,000 mg in one day. Acetaminophen is found in many medicines (both prescribed and over-the-counter medicines). Read all labels to be sure you don't take too much.   For children, check the Tylenol bottle for the right dose. The dose is based on the child's age or weight.    If you have other health problems (like cancer, heart failure, an organ transplant or severe kidney disease): Call your specialty clinic if you don't feel better in the next 2 days.       Know when to call 911. Emergency warning signs include:    Trouble breathing or shortness of breath Pain or pressure in the chest that doesn't go away Feeling confused like you haven't felt before, or not being able to wake up Bluish-colored lips or face.  Where can I get more information?    [facility] -- About COVID-19: [email or web address]   CDC -- What to Do If You're Sick:  www.cdc.gov/coronavirus/2019-ncov/about/steps-when-sick.html   CDC -- Ending Home Isolation: www.cdc.gov/coronavirus/2019-ncov/hcp/disposition-in-home-patients.html   [facility] -- Caring for Someone: www.cdc.gov/coronavirus/2019-ncov/if-you-are-sick/care-for-someone.html   [facility] -- Interim Guidance for Hospital Discharge to Home: www.[facility].mn./diseases/coronavirus/hcp/hospdischarge.pdf   [facility] clinical trials (COVID-19 research studies): [email or web address].[facility]-clinical-trials    Below are the COVID-19 hotlines at the [place] Department of Health ([facility]). Interpreters are available.    For health questions: Call 119-057-0069 or 1-935.451.5284 (7 a.m. to 7 p.m.) For questions about schools and childcare: Call 854-857-8330 or 1-450.482.9180 (7 a.m. to 7 p.m.)    Diagnosis: Acute upper respiratory infection, unspecified  Diagnosis ICD: J06.9  Additional Clinician Notes:   If your symptoms are not improving or worsen, please go to one of our urgent care locations for evaluation.

## 2020-09-01 ENCOUNTER — COMMUNICATION - HEALTHEAST (OUTPATIENT)
Dept: SCHEDULING | Facility: CLINIC | Age: 40
End: 2020-09-01

## 2020-11-29 ENCOUNTER — HEALTH MAINTENANCE LETTER (OUTPATIENT)
Age: 40
End: 2020-11-29

## 2021-09-19 ENCOUNTER — HEALTH MAINTENANCE LETTER (OUTPATIENT)
Age: 41
End: 2021-09-19

## 2022-01-09 ENCOUNTER — HEALTH MAINTENANCE LETTER (OUTPATIENT)
Age: 42
End: 2022-01-09

## 2022-11-21 ENCOUNTER — HEALTH MAINTENANCE LETTER (OUTPATIENT)
Age: 42
End: 2022-11-21

## 2023-04-16 ENCOUNTER — HEALTH MAINTENANCE LETTER (OUTPATIENT)
Age: 43
End: 2023-04-16

## 2024-02-04 ENCOUNTER — HEALTH MAINTENANCE LETTER (OUTPATIENT)
Age: 44
End: 2024-02-04